# Patient Record
(demographics unavailable — no encounter records)

---

## 2024-10-09 NOTE — REVIEW OF SYSTEMS
[Negative] : Allergic/Immunologic [FreeTextEntry8] : hx of elevated creatinine from lithium [de-identified] : mild hypothyroid

## 2024-10-09 NOTE — PHYSICAL EXAM
[Walk Is Unsteady (Ataxia)] : no ataxia [Wide-Based] : not wide-based [None] : none [Rigidity] : no rigidity [Dystonia] : no dystonia was noted [Labile] : not labile [Normal] : normal [Fair] : fair [FreeTextEntry8] : little down [2 - Borderline mentally ill] : 2 - Borderline mentally ill (subtle or suspected pathology) [5 - Minimally worse] : 5 - Minimally worse  (slightly worse but may not be clinically meaningful; may represent very little change in basic clinical status or functional capacity)

## 2024-10-09 NOTE — PAST MEDICAL HISTORY
[FreeTextEntry1] : 1st psychiatric contact age 28, 1st hospitalization for ayesha  5 total hospitalizations (age 28, 30, 36) and 2 this summer 2021  no SA  Was on lithium for years but developed elevated creatinine and was d/c, also had elevated LFTs on depakote

## 2024-10-09 NOTE — REASON FOR VISIT
[FreeTextEntry2] : 10/24 ISTOP checked [Patient] : Patient [Mother] : mother [FreeTextEntry1] : "I am still a little down, little better from before, not 100%"

## 2024-10-09 NOTE — CURRENT PSYCHIATRIC SYMPTOMS
[Depressed Mood] : no depressed mood [Anhedonia] : anhedonia [Guilt] : not feeling guilty [Decreased Concentration] : no decrease in concentrating ability [Insomnia] : no insomnia disorder [Hypersomnia] : no ~T hypersomnia [Psychomotor Retardation] : no psychomotor retardation [Euphoria] : no euphoria [Highly Irritable] : no high irritability [Increased Activity] : no increased in activity [Distractibility] : not distracted [Talkativeness] : no talkativeness [Grandeur] : no feelings of grandeur [Buying Sprees] : no buying sprees [Hypersexuality] : denied hypersexuality [Dec Need For Sleep] : no decreased need for sleep [Delusions] : no ~T delusions [Hallucination Visual] : no visual hallucinations [Hallucination Auditory] : no auditory hallucinations [Hallucination Tactile] : no tactile hallucinations [Thought Disorder] : ~T a thought disorder was not noted [Excessive Worry] : no excessive worries [Ruminations] : no rumination disorder [Obsessions] : no obsessions [Re-experiencing] : no re-experiencing [Restlessness] : no restlessness [Hypochondriasis] : no hypochondriasis [Panic] : no panic disorder [Recent Onset] : no recent onset of confusion

## 2024-10-09 NOTE — HISTORY OF PRESENT ILLNESS
[FreeTextEntry1] : This is a 40 year old patient, domiciled with Mother, on SSD, with history of Bipolar d/o, anxiety, elevated creatinine, hypothyroid who presents for medication management.  The patient reports mild depressed mood, fair sleep and appetite.  The patient denies any symptoms of ayesha and psychosis at this time.  The patient has less anxiety that impairs their daily functioning. The patient denies any suicidal ideation, homicidal ideation, no auditory or visual hallucinations, or paranoid ideation.   The denies patients denies any drug or alcohol use, and is a non-smoker. I requested the patient's updated physical and labs from their PCP, Dr. Landry.  Coping skills were discussed and a safety plan was provided.  The patient was educated on the risks, benefits, and alternatives of their psychiatric medications.  The patient will not engage in psychotherapy at this time.  The patient consents to ongoing medication management.  Patient seen with Mother, discussed medication options at length.  Safety planning discussed at length, patient has no past or present SI/HI, but is not at baseline.    Patient provided PHQ9 and Clemens rating scale, yet to do.  Labs 9/23 IPP at [facility] reviewed, diet and exercise encouraged.  Per Mother, patient minimally better, risks, benefits dicussed, will lower pm clozapine, discussed augmenting with strattera, lamictal or lithium,m or trileptal again.  They prefer to wean off clozapine, and try trileptal first.  Labs reviewed,  12/23: patient and Mother reports he has some impaired sleep, impaired concentration and daytime sedation.  discussed options at length including resuming clozapine, increasing lorazepam, and increasing risperdal. Risk, benefits discussed, will raise lorazepam 1.5mg-2mg at bedtime and risperdqal to 1mg po qam and 4mg at bedtime 12/27:will give current medications 2 more weeks and then adjust again if needed, consider low dose seroquel as he benefitted from it for depressive symptoms, and has failed monotherapy multiple times 1/11:patient appears close to baseline, Mother and patient agree to continue current medications, with option to lower risperdal in the future if needed 2/7:adjust risperdal 3/6:adjust risperdal 4/3:adjust risperda, conside adding abilify 5/1:lower risperdal and add abilify 2mg po qam, risks, benefits discussed, failed monotherapy, understands risks, MOther agrees as well  5/29:stable, at baseline 6/26:at baseline 7/24:adjust med per plan 9/11:adjust meds per plan    [No] : no [Responsibility to family or others] : responsibility to family or others [Identifies reasons for living] : identifies reasons for living [Future oriented] : future oriented [Supportive social network or family] : supportive social network or family [Fear of death or dying due to pain/suffering] : fear of death or dying due to pain/suffering [Positive therapeutic relationships] : positive therapeutic relationships [Ability to cope with stress] : ability to cope with stress [None Known] : none known [Residential stability] : residential stability [Relationship stability] : relationship stability [Sobriety] : sobriety [Engagement in treatment] : engagement in treatment [Affective stability] : affective stability [Insight into violence risk and need for management/ treatment] : insight into violence risk and need for management/ treatment [Good treatment response/ compliance] : good treatment response/ compliance

## 2024-10-09 NOTE — FAMILY HISTORY
[FreeTextEntry1] : Family history: Maternal grandmother was bipolar (she had a suicide attempt, survived)

## 2024-10-09 NOTE — PLAN
[No] : No [FreeTextEntry4] : Patient mood is improving with good energy  anxiety is less  no psychosis, improved  Overall health is stable in terms of diet and exercise, labs updated

## 2024-10-09 NOTE — DISCUSSION/SUMMARY
[Date of Last Physical Exam: _____] : Date of Last Physical Exam: [unfilled] [Date of Last Annual Labs: _____] : Date of Last Annual Labs: [unfilled] [Annual Review of Systems Completed?] : Annual Review of Systems Completed: Yes [Tobacco Screening Completed?] : Tobacco Screening Completed: Yes [Date of Last AIMS: _____] : Date of Last AIMS: [unfilled] [Date of Last HbgA1c: _____] : Date of Last HbgA1c: [unfilled] [Date of Last Lipid Profile: _____] : Date of Last Lipid Profile: [unfilled] [Potential impact of patient’s physical health conditions on psychiatric care?] : Potential impact of patient's physical health conditions on psychiatric care: No [Does patient require any additional health services or referrals?] : Does patient require any additional health services or referrals: No [FreeTextEntry1] : Medication management  Continue current medications 1. risperdal 2mg at bedtime 3. cogentin 0.5mg po BID 4. lorazepam 1.5mg at bedtime only 5. trileptal 300mg po BID 6. abilify 7mg po qam, raise to 10mg am  Follow up in 4 weeks, will call if needs anything sooner

## 2024-10-09 NOTE — SOCIAL HISTORY
[With Family] : lives with family [Disabled] : disabled [Never ] : never  [College] : College [None] : none [No Known Substance Use] : no known substance use

## 2024-11-06 NOTE — HISTORY OF PRESENT ILLNESS
[No] : no [Responsibility to family or others] : responsibility to family or others [Identifies reasons for living] : identifies reasons for living [Future oriented] : future oriented [Supportive social network or family] : supportive social network or family [Fear of death or dying due to pain/suffering] : fear of death or dying due to pain/suffering [Positive therapeutic relationships] : positive therapeutic relationships [Ability to cope with stress] : ability to cope with stress [None Known] : none known [Residential stability] : residential stability [Relationship stability] : relationship stability [Sobriety] : sobriety [Engagement in treatment] : engagement in treatment [Affective stability] : affective stability [Insight into violence risk and need for management/ treatment] : insight into violence risk and need for management/ treatment [Good treatment response/ compliance] : good treatment response/ compliance [FreeTextEntry1] : This is a 40 year old patient, domiciled with Mother, on SSD, with history of Bipolar d/o, anxiety, elevated creatinine, hypothyroid who presents for medication management.  The patient reports mild depressed mood, fair sleep and appetite.  The patient denies any symptoms of ayesha and psychosis at this time.  The patient has less anxiety that impairs their daily functioning. The patient denies any suicidal ideation, homicidal ideation, no auditory or visual hallucinations, or paranoid ideation.   The denies patients denies any drug or alcohol use, and is a non-smoker. I requested the patient's updated physical and labs from their PCP, Dr. Landry.  Coping skills were discussed and a safety plan was provided.  The patient was educated on the risks, benefits, and alternatives of their psychiatric medications.  The patient will not engage in psychotherapy at this time.  The patient consents to ongoing medication management.  Patient seen with Mother, discussed medication options at length.  Safety planning discussed at length, patient has no past or present SI/HI, but is not at baseline.    Patient provided PHQ9 and Clemens rating scale, yet to do.  Labs 9/23 IPP at [facility] reviewed, diet and exercise encouraged.  Per Mother, patient minimally better, risks, benefits dicussed, will lower pm clozapine, discussed augmenting with strattera, lamictal or lithium,m or trileptal again.  They prefer to wean off clozapine, and try trileptal first.  Labs reviewed,  12/23: patient and Mother reports he has some impaired sleep, impaired concentration and daytime sedation.  discussed options at length including resuming clozapine, increasing lorazepam, and increasing risperdal. Risk, benefits discussed, will raise lorazepam 1.5mg-2mg at bedtime and risperdqal to 1mg po qam and 4mg at bedtime 12/27:will give current medications 2 more weeks and then adjust again if needed, consider low dose seroquel as he benefitted from it for depressive symptoms, and has failed monotherapy multiple times 1/11:patient appears close to baseline, Mother and patient agree to continue current medications, with option to lower risperdal in the future if needed 2/7:adjust risperdal 3/6:adjust risperdal 4/3:adjust risperda, conside adding abilify 5/1:lower risperdal and add abilify 2mg po qam, risks, benefits discussed, failed monotherapy, understands risks, MOther agrees as well  5/29:stable, at baseline 6/26:at baseline 7/24:adjust med per plan 9/11:adjust meds per plan 11/6: adjust meds

## 2024-11-06 NOTE — REASON FOR VISIT
[Patient] : Patient [Mother] : mother [FreeTextEntry2] : 11/24 ISTOP checked [FreeTextEntry1] : "I am still a little down. "

## 2024-11-06 NOTE — CURRENT PSYCHIATRIC SYMPTOMS
[Anhedonia] : anhedonia [Depressed Mood] : no depressed mood [Guilt] : not feeling guilty [Decreased Concentration] : no decrease in concentrating ability [Insomnia] : no insomnia disorder [Hypersomnia] : no ~T hypersomnia [Psychomotor Retardation] : no psychomotor retardation [Euphoria] : no euphoria [Highly Irritable] : no high irritability [Increased Activity] : no increased in activity [Distractibility] : not distracted [Talkativeness] : no talkativeness [Grandeur] : no feelings of grandeur [Buying Sprees] : no buying sprees [Hypersexuality] : denied hypersexuality [Dec Need For Sleep] : no decreased need for sleep [Delusions] : no ~T delusions [Hallucination Visual] : no visual hallucinations [Hallucination Auditory] : no auditory hallucinations [Hallucination Tactile] : no tactile hallucinations [Thought Disorder] : ~T a thought disorder was not noted [Excessive Worry] : no excessive worries [Ruminations] : no rumination disorder [Obsessions] : no obsessions [Re-experiencing] : no re-experiencing [Restlessness] : no restlessness [Hypochondriasis] : no hypochondriasis [Panic] : no panic disorder [Recent Onset] : no recent onset of confusion

## 2024-11-06 NOTE — PHYSICAL EXAM
[None] : none [Normal] : normal [Fair] : fair [2 - Borderline mentally ill] : 2 - Borderline mentally ill (subtle or suspected pathology) [Walk Is Unsteady (Ataxia)] : no ataxia [Wide-Based] : not wide-based [Rigidity] : no rigidity [Dystonia] : no dystonia was noted [Labile] : not labile [FreeTextEntry8] : little down [5 - Minimally worse] : 5 - Minimally worse  (slightly worse but may not be clinically meaningful; may represent very little change in basic clinical status or functional capacity)

## 2024-11-06 NOTE — DISCUSSION/SUMMARY
[Date of Last Physical Exam: _____] : Date of Last Physical Exam: [unfilled] [Date of Last Annual Labs: _____] : Date of Last Annual Labs: [unfilled] [Annual Review of Systems Completed?] : Annual Review of Systems Completed: Yes [Tobacco Screening Completed?] : Tobacco Screening Completed: Yes [Date of Last AIMS: _____] : Date of Last AIMS: [unfilled] [Date of Last HbgA1c: _____] : Date of Last HbgA1c: [unfilled] [Date of Last Lipid Profile: _____] : Date of Last Lipid Profile: [unfilled] [Potential impact of patientâ??s physical health conditions on psychiatric care?] : Potential impact of patient's physical health conditions on psychiatric care: No [Does patient require any additional health services or referrals?] : Does patient require any additional health services or referrals: No [FreeTextEntry1] : Medication management  Continue current medications 1. risperdal 2mg at bedtime 3. cogentin 0.5mg po BID 4. lorazepam 1.5mg at bedtime only 5. trileptal 300mg po BID 6. abilify 10mg po qam, raise to 12mg am  Follow up in 4 weeks, will call if needs anything sooner

## 2024-11-06 NOTE — REVIEW OF SYSTEMS
[Negative] : Allergic/Immunologic [FreeTextEntry8] : hx of elevated creatinine from lithium [de-identified] : mild hypothyroid

## 2024-12-04 NOTE — REVIEW OF SYSTEMS
[Negative] : Allergic/Immunologic [FreeTextEntry8] : hx of elevated creatinine from lithium [de-identified] : mild hypothyroid

## 2024-12-04 NOTE — DISCUSSION/SUMMARY
[Date of Last Physical Exam: _____] : Date of Last Physical Exam: [unfilled] [Date of Last Annual Labs: _____] : Date of Last Annual Labs: [unfilled] [Annual Review of Systems Completed?] : Annual Review of Systems Completed: Yes [Tobacco Screening Completed?] : Tobacco Screening Completed: Yes [Date of Last AIMS: _____] : Date of Last AIMS: [unfilled] [Date of Last HbgA1c: _____] : Date of Last HbgA1c: [unfilled] [Date of Last Lipid Profile: _____] : Date of Last Lipid Profile: [unfilled] [Potential impact of patientâ??s physical health conditions on psychiatric care?] : Potential impact of patient's physical health conditions on psychiatric care: No [Does patient require any additional health services or referrals?] : Does patient require any additional health services or referrals: No [FreeTextEntry1] : Medication management  Continue current medications 1. risperdal 2mg at bedtime 3. cogentin 0.5mg po BID 4. lorazepam 1.5mg at bedtime only 5. trileptal 300mg po BID 6. abilify 12mg po qam, raise to 15mg am  Follow up in 4 weeks, will call if needs anything sooner

## 2024-12-04 NOTE — PHYSICAL EXAM
[None] : none [Normal] : normal [Fair] : fair [2 - Borderline mentally ill] : 2 - Borderline mentally ill (subtle or suspected pathology) [5 - Minimally worse] : 5 - Minimally worse  (slightly worse but may not be clinically meaningful; may represent very little change in basic clinical status or functional capacity) [Walk Is Unsteady (Ataxia)] : no ataxia [Wide-Based] : not wide-based [Rigidity] : no rigidity [Dystonia] : no dystonia was noted [Labile] : not labile [FreeTextEntry8] : little down

## 2024-12-04 NOTE — REASON FOR VISIT
[Patient] : Patient [Mother] : mother [FreeTextEntry2] : 12/24 ISTOP checked [FreeTextEntry1] : "I am still a little down. "

## 2024-12-04 NOTE — HISTORY OF PRESENT ILLNESS
[No] : no [Responsibility to family or others] : responsibility to family or others [Identifies reasons for living] : identifies reasons for living [Future oriented] : future oriented [Supportive social network or family] : supportive social network or family [Fear of death or dying due to pain/suffering] : fear of death or dying due to pain/suffering [Positive therapeutic relationships] : positive therapeutic relationships [Ability to cope with stress] : ability to cope with stress [None Known] : none known [Residential stability] : residential stability [Relationship stability] : relationship stability [Sobriety] : sobriety [Engagement in treatment] : engagement in treatment [Affective stability] : affective stability [Insight into violence risk and need for management/ treatment] : insight into violence risk and need for management/ treatment [Good treatment response/ compliance] : good treatment response/ compliance [FreeTextEntry1] : This is a 40 year old patient, domiciled with Mother, on SSD, with history of Bipolar d/o, anxiety, elevated creatinine, hypothyroid who presents for medication management.  The patient reports mild depressed mood, fair sleep and appetite.  The patient denies any symptoms of ayesha and psychosis at this time.  The patient has less anxiety that impairs their daily functioning. The patient denies any suicidal ideation, homicidal ideation, no auditory or visual hallucinations, or paranoid ideation.   The denies patients denies any drug or alcohol use, and is a non-smoker. I requested the patient's updated physical and labs from their PCP, Dr. Landry.  Coping skills were discussed and a safety plan was provided.  The patient was educated on the risks, benefits, and alternatives of their psychiatric medications.  The patient will not engage in psychotherapy at this time.  The patient consents to ongoing medication management.  Patient seen with Mother, discussed medication options at length.  Safety planning discussed at length, patient has no past or present SI/HI, but is not at baseline.    Patient provided PHQ9 and Clemens rating scale, yet to do.  Labs 9/23 IPP at [facility] reviewed, diet and exercise encouraged.  Per Mother, patient minimally better, risks, benefits dicussed, will lower pm clozapine, discussed augmenting with strattera, lamictal or lithium,m or trileptal again.  They prefer to wean off clozapine, and try trileptal first.  Labs reviewed,  12/23: patient and Mother reports he has some impaired sleep, impaired concentration and daytime sedation.  discussed options at length including resuming clozapine, increasing lorazepam, and increasing risperdal. Risk, benefits discussed, will raise lorazepam 1.5mg-2mg at bedtime and risperdqal to 1mg po qam and 4mg at bedtime 12/27:will give current medications 2 more weeks and then adjust again if needed, consider low dose seroquel as he benefitted from it for depressive symptoms, and has failed monotherapy multiple times 1/11:patient appears close to baseline, Mother and patient agree to continue current medications, with option to lower risperdal in the future if needed 2/7:adjust risperdal 3/6:adjust risperdal 4/3:adjust risperda, conside adding abilify 5/1:lower risperdal and add abilify 2mg po qam, risks, benefits discussed, failed monotherapy, understands risks, MOther agrees as well  5/29:stable, at baseline 6/26:at baseline 7/24:adjust med per plan 9/11:adjust meds per plan 11/6: adjust meds 12/4: adjust meds

## 2024-12-31 NOTE — DISCUSSION/SUMMARY
[Date of Last Physical Exam: _____] : Date of Last Physical Exam: [unfilled] [Date of Last Annual Labs: _____] : Date of Last Annual Labs: [unfilled] [Annual Review of Systems Completed?] : Annual Review of Systems Completed: Yes [Tobacco Screening Completed?] : Tobacco Screening Completed: Yes [Date of Last AIMS: _____] : Date of Last AIMS: [unfilled] [Date of Last HbgA1c: _____] : Date of Last HbgA1c: [unfilled] [Date of Last Lipid Profile: _____] : Date of Last Lipid Profile: [unfilled] [Potential impact of patientâ??s physical health conditions on psychiatric care?] : Potential impact of patient's physical health conditions on psychiatric care: No [Does patient require any additional health services or referrals?] : Does patient require any additional health services or referrals: No [FreeTextEntry1] : Medication management  Continue current medications 1. risperdal 2mg at bedtime 3. cogentin 0.5mg po BID 4. lorazepam 1.5mg at bedtime only 5. trileptal 300mg po BID 6. abilify 15mg am  Follow up in 4 weeks, will call if needs anything sooner

## 2024-12-31 NOTE — REASON FOR VISIT
[Patient] : Patient [Mother] : mother [Telehealth (audio & video) - Individual/Group] : This visit was provided via telehealth using real-time 2-way audio visual technology. [FreeTextEntry4] : 1016am [FreeTextEntry5] : [id number] [FreeTextEntry2] : 12/24 ISTOP checked [FreeTextEntry1] : "I am better, let's keep the meds the same."

## 2024-12-31 NOTE — HISTORY OF PRESENT ILLNESS
[No] : no [Responsibility to family or others] : responsibility to family or others [Identifies reasons for living] : identifies reasons for living [Future oriented] : future oriented [Supportive social network or family] : supportive social network or family [Fear of death or dying due to pain/suffering] : fear of death or dying due to pain/suffering [Positive therapeutic relationships] : positive therapeutic relationships [Ability to cope with stress] : ability to cope with stress [None Known] : none known [Residential stability] : residential stability [Relationship stability] : relationship stability [Sobriety] : sobriety [Engagement in treatment] : engagement in treatment [Affective stability] : affective stability [Insight into violence risk and need for management/ treatment] : insight into violence risk and need for management/ treatment [Good treatment response/ compliance] : good treatment response/ compliance [FreeTextEntry1] : This is a 40 year old patient, domiciled with Mother, on SSD, with history of Bipolar d/o, anxiety, elevated creatinine, hypothyroid who presents for medication management.  The patient reports less depressed mood, fair sleep and appetite.  The patient denies any symptoms of ayesha and psychosis at this time.  The patient has less anxiety that impairs their daily functioning. The patient denies any suicidal ideation, homicidal ideation, no auditory or visual hallucinations, or paranoid ideation.   The denies patients denies any drug or alcohol use, and is a non-smoker. I requested the patient's updated physical and labs from their PCP, Dr. Landry.  Coping skills were discussed and a safety plan was provided.  The patient was educated on the risks, benefits, and alternatives of their psychiatric medications.  The patient will not engage in psychotherapy at this time.  The patient consents to ongoing medication management.  Patient seen with Mother, discussed medication options at length.  Safety planning discussed at length, patient has no past or present SI/HI, but is not at baseline.    Patient provided PHQ9 and Clemens rating scale, yet to do.  [facility], diet and exercise encouraged.  Per Mother, patient minimally better, risks, benefits dicussed, will lower pm clozapine, discussed augmenting with strattera, lamictal or lithium,m or trileptal again.  They prefer to wean off clozapine, and try trileptal first.  Labs reviewed,  12/23: patient and Mother reports he has some impaired sleep, impaired concentration and daytime sedation.  discussed options at length including resuming clozapine, increasing lorazepam, and increasing risperdal. Risk, benefits discussed, will raise lorazepam 1.5mg-2mg at bedtime and risperdqal to 1mg po qam and 4mg at bedtime 12/27:will give current medications 2 more weeks and then adjust again if needed, consider low dose seroquel as he benefitted from it for depressive symptoms, and has failed monotherapy multiple times 1/11:patient appears close to baseline, Mother and patient agree to continue current medications, with option to lower risperdal in the future if needed 2/7:adjust risperdal 3/6:adjust risperdal 4/3:adjust risperda, conside adding abilify 5/1:lower risperdal and add abilify 2mg po qam, risks, benefits discussed, failed monotherapy, understands risks, MOther agrees as well  5/29:stable, at baseline 6/26:at baseline 7/24:adjust med per plan 9/11:adjust meds per plan 11/6: adjust meds 12/4: adjust meds 12/31:continue meds

## 2024-12-31 NOTE — REVIEW OF SYSTEMS
[Negative] : Allergic/Immunologic [FreeTextEntry8] : hx of elevated creatinine from lithium [de-identified] : mild hypothyroid

## 2024-12-31 NOTE — PHYSICAL EXAM
[None] : none [Normal] : normal [Fair] : fair [2 - Borderline mentally ill] : 2 - Borderline mentally ill (subtle or suspected pathology) [Walk Is Unsteady (Ataxia)] : no ataxia [Wide-Based] : not wide-based [Rigidity] : no rigidity [Dystonia] : no dystonia was noted [Labile] : not labile [2 - Much improved] : 2 - Much improved  (notably better with significant reduction of symptoms; increase in the level of functioning but some symptoms remain)

## 2025-01-29 NOTE — FAMILY HISTORY
[FreeTextEntry1] : Family history: Maternal grandmother was bipolar (she had a suicide attempt, survived)
No adenopathy or splenomegaly. No cervical or inguinal lymphadenopathy.

## 2025-01-29 NOTE — PHYSICAL EXAM
[None] : none [Normal] : normal [Fair] : fair [Walk Is Unsteady (Ataxia)] : no ataxia [Wide-Based] : not wide-based [Rigidity] : no rigidity [Dystonia] : no dystonia was noted [Constricted] : constricted [FreeTextEntry8] : depressed [3 - Mildly ill] : 3 - Mildly ill  (clearly established symptoms with minimal, if any, distress or difficulty in social and occupational function) [5 - Minimally worse] : 5 - Minimally worse  (slightly worse but may not be clinically meaningful; may represent very little change in basic clinical status or functional capacity)

## 2025-01-29 NOTE — REVIEW OF SYSTEMS
[Negative] : Allergic/Immunologic [FreeTextEntry8] : hx of elevated creatinine from lithium [de-identified] : mild hypothyroid

## 2025-01-29 NOTE — REASON FOR VISIT
[Patient] : Patient [Mother] : mother [FreeTextEntry2] : 1/25 ISTOP checked [FreeTextEntry1] : "I am more depressed, I think the higher dose of abilify makes my anxious."

## 2025-01-29 NOTE — HISTORY OF PRESENT ILLNESS
[No] : no [Responsibility to family or others] : responsibility to family or others [Identifies reasons for living] : identifies reasons for living [Future oriented] : future oriented [Supportive social network or family] : supportive social network or family [Fear of death or dying due to pain/suffering] : fear of death or dying due to pain/suffering [Positive therapeutic relationships] : positive therapeutic relationships [Ability to cope with stress] : ability to cope with stress [None Known] : none known [Residential stability] : residential stability [Relationship stability] : relationship stability [Sobriety] : sobriety [Engagement in treatment] : engagement in treatment [Affective stability] : affective stability [Insight into violence risk and need for management/ treatment] : insight into violence risk and need for management/ treatment [Good treatment response/ compliance] : good treatment response/ compliance [FreeTextEntry1] : This is a 40 year old patient, domiciled with Mother, on SSD, with history of Bipolar d/o, anxiety, elevated creatinine, hypothyroid who presents for medication management.  The patient reports more depressed mood, fair sleep and appetite.  The patient denies any symptoms of ayesha and psychosis at this time.  The patient has more anxiety that impairs their daily functioning. The patient denies any suicidal ideation, homicidal ideation, no auditory or visual hallucinations, or paranoid ideation.   The denies patients denies any drug or alcohol use, and is a non-smoker. I requested the patient's updated physical and labs from their PCP, Dr. Landry.  Coping skills were discussed and a safety plan was provided.  The patient was educated on the risks, benefits, and alternatives of their psychiatric medications.  The patient will not engage in psychotherapy at this time.  The patient consents to ongoing medication management.  Patient seen with Mother, discussed medication options at length  12/23: patient and Mother reports he has some impaired sleep, impaired concentration and daytime sedation.  discussed options at length including resuming clozapine, increasing lorazepam, and increasing risperdal. Risk, benefits discussed, will raise lorazepam 1.5mg-2mg at bedtime and risperdqal to 1mg po qam and 4mg at bedtime 12/27:will give current medications 2 more weeks and then adjust again if needed, consider low dose seroquel as he benefitted from it for depressive symptoms, and has failed monotherapy multiple times 1/11:patient appears close to baseline, Mother and patient agree to continue current medications, with option to lower risperdal in the future if needed 2/7:adjust risperdal 3/6:adjust risperdal 4/3:adjust risperda, conside adding abilify 5/1:lower risperdal and add abilify 2mg po qam, risks, benefits discussed, failed monotherapy, understands risks, MOther agrees as well  5/29:stable, at baseline 6/26:at baseline 7/24:adjust med per plan 9/11:adjust meds per plan 11/6: adjust meds 12/4: adjust meds 12/31:continue meds 1/18: resume risperdal 1mg po qam and 2mg at bedtime for "anxiety and intrusive thoughts" call in 1 week if needed or follow up in 2 weeks 1/27: patient is restless, still feeling lack of motivation, agree to lower abilify to 10mg and lower lorazepam to 1mg at bedtime, f/u in 2 days 1/29:lower abilify to 5mg daily in 3-4 days and start lamictal 25mg daily for 2 weeks then BID

## 2025-01-29 NOTE — PLAN
[No] : No [FreeTextEntry4] : Patient mood is now more depressed  anxiety is more, likely akathisia  no psychosis, improved  Overall health is stable in terms of diet and exercise, labs updated

## 2025-01-29 NOTE — DISCUSSION/SUMMARY
[Date of Last Physical Exam: _____] : Date of Last Physical Exam: [unfilled] [Date of Last Annual Labs: _____] : Date of Last Annual Labs: [unfilled] [Annual Review of Systems Completed?] : Annual Review of Systems Completed: Yes [Tobacco Screening Completed?] : Tobacco Screening Completed: Yes [Date of Last AIMS: _____] : Date of Last AIMS: [unfilled] [Date of Last HbgA1c: _____] : Date of Last HbgA1c: [unfilled] [Date of Last Lipid Profile: _____] : Date of Last Lipid Profile: [unfilled] [Potential impact of patientâ??s physical health conditions on psychiatric care?] : Potential impact of patient's physical health conditions on psychiatric care: No [Does patient require any additional health services or referrals?] : Does patient require any additional health services or referrals: No [FreeTextEntry1] : Medication management  Continue current medications 1. risperdal 2mg at bedtime 3. cogentin 0.5mg po BID 4. lorazepam 1.5mg at bedtime only, lowered to 1mg at bedtime 5. trileptal 300mg po BID 6. abilify 15mg am, lowered to 10mg then 5mg daily 7. start lamictal 25mg po daily for 2 weeks then BID  Follow up in 4 weeks, will call if needs anything sooner

## 2025-02-26 NOTE — REVIEW OF SYSTEMS
[Negative] : Allergic/Immunologic [FreeTextEntry8] : hx of elevated creatinine from lithium [de-identified] : mild hypothyroid

## 2025-02-26 NOTE — DISCUSSION/SUMMARY
[Date of Last Physical Exam: _____] : Date of Last Physical Exam: [unfilled] [Date of Last Annual Labs: _____] : Date of Last Annual Labs: [unfilled] [Annual Review of Systems Completed?] : Annual Review of Systems Completed: Yes [Tobacco Screening Completed?] : Tobacco Screening Completed: Yes [Date of Last AIMS: _____] : Date of Last AIMS: [unfilled] [Date of Last HbgA1c: _____] : Date of Last HbgA1c: [unfilled] [Date of Last Lipid Profile: _____] : Date of Last Lipid Profile: [unfilled] [Potential impact of patientÃ¢Â?Â?s physical health conditions on psychiatric care?] : Potential impact of patient's physical health conditions on psychiatric care: No [Does patient require any additional health services or referrals?] : Does patient require any additional health services or referrals: No [Plan Review] : Plan Review [Able to manage surrounding demands and opportunities] : able to manage surrounding demands and opportunities [Able to exercise self-direction] : able to exercise self-direction [Able to set and pursue goals] : able to set and pursue goals [Adherent to treatment recommendations] : adherent to treatment recommendations [Articulate] : articulate [Attempting to realize their potential] : Attempting to realize their potential [Awareness of substance use issues] : awareness of substance use issues [Cognitively intact] : cognitively intact [Good impulse control] : good impulse control [Insightful] : insightful [Creative] : creative [Intelligent] : intelligent [Motivated to participate in treatment] : motivated to participate in treatment [Motivated and ready for change] : motivated and ready for change [Motivated to maintain or improve physical health] : motivated to maintain or improve physical health [In good health] : in good health [Has vocational interests or hobbies] : has vocational interests or hobbies [Part of a supportive family] : part of a supportive family [Housing stability] : housing stability [High level of education] : high level of education [English fluency] : English fluency [Connected to healthcare] : connected to healthcare [Access to safe outdoor spaces] : access to safe outdoor spaces [Social supports] : social supports [FreeTextEntry2] : 1/2/26 [FreeTextEntry3] : see chart [FreeTextEntry5] : stable, see progress notes plan for updates each visit [Mental Health] : Mental Health [Physical Health] : Physical Health [Other rationale for transition/discharge:] : Other rationale for transition/discharge: [None - Reason others did not participate:] : None - Reason others did not participate:  [Psychiatric Provider/Prescriber] : Psychiatric Provider/Prescriber [FreeTextEntry1] : see above [de-identified] : chronic condition [Denied] : Denied [No] : No [Completed, copy requested] : Completed, copy requested [Advised to schedule] : Advised to schedule [Ordered] : Ordered [Yes] : Yes [From last 12 months, Score: ___] : AIMS results from last 12 months, Score: [unfilled] [Does patient use tobacco products?] : Patient does not use tobacco products [Does patient use medical marijuana?] : Patient does not use medical marijuana. [Patient has been tested for HIV?] : Patient has not been tested for HIV [Patient has been tested for Hepatitis?] : Patient has not been tested for hepatitis [Patient would like to be tested/re-tested?] : patient would not like to be tested/re-tested [Current or past COVID-19 diagnosis?] : Patient had a present or past COVID-19 diagnosis [Vaccinated?] : is vaccinated [Education provided about COVID-19?] : Education provided about COVID-19

## 2025-02-26 NOTE — PHYSICAL EXAM
[None] : none [Constricted] : constricted [Fair] : fair [3 - Mildly ill] : 3 - Mildly ill  (clearly established symptoms with minimal, if any, distress or difficulty in social and occupational function) [Normal] : full range, appropriate to content [3 - Minimally improved] : 3 - Minimally improved  (slightly better with little or no clinically meaningful reduction of symptoms. Represents very little change in basic clinical status, level of care, or functional capacity) [Walk Is Unsteady (Ataxia)] : no ataxia [Wide-Based] : not wide-based [Rigidity] : no rigidity [Dystonia] : no dystonia was noted [FreeTextEntry8] : better

## 2025-02-26 NOTE — REVIEW OF SYSTEMS
[Negative] : Allergic/Immunologic [FreeTextEntry8] : hx of elevated creatinine from lithium [de-identified] : mild hypothyroid

## 2025-02-26 NOTE — HISTORY OF PRESENT ILLNESS
[No] : no [Responsibility to family or others] : responsibility to family or others [Identifies reasons for living] : identifies reasons for living [Future oriented] : future oriented [Supportive social network or family] : supportive social network or family [Fear of death or dying due to pain/suffering] : fear of death or dying due to pain/suffering [Positive therapeutic relationships] : positive therapeutic relationships [Ability to cope with stress] : ability to cope with stress [None Known] : none known [Residential stability] : residential stability [Relationship stability] : relationship stability [Sobriety] : sobriety [Engagement in treatment] : engagement in treatment [Affective stability] : affective stability [Insight into violence risk and need for management/ treatment] : insight into violence risk and need for management/ treatment [Good treatment response/ compliance] : good treatment response/ compliance [FreeTextEntry1] : This is a 40 year old patient, domiciled with Mother, on SSD, with history of Bipolar d/o, anxiety, elevated creatinine, hypothyroid who presents for medication management.  The patient reports more depressed mood, fair sleep and appetite.  The patient denies any symptoms of ayehsa and psychosis at this time.  The patient has more anxiety that impairs their daily functioning. The patient denies any suicidal ideation, homicidal ideation, no auditory or visual hallucinations, or paranoid ideation.   The denies patients denies any drug or alcohol use, and is a non-smoker. I requested the patient's updated physical and labs from their PCP, Dr. Landry.  Coping skills were discussed and a safety plan was provided.  The patient was educated on the risks, benefits, and alternatives of their psychiatric medications.  The patient will not engage in psychotherapy at this time.  The patient consents to ongoing medication management.  Patient seen with Mother, discussed medication options at length  12/23: patient and Mother reports he has some impaired sleep, impaired concentration and daytime sedation.  discussed options at length including resuming clozapine, increasing lorazepam, and increasing risperdal. Risk, benefits discussed, will raise lorazepam 1.5mg-2mg at bedtime and risperdqal to 1mg po qam and 4mg at bedtime 12/27:will give current medications 2 more weeks and then adjust again if needed, consider low dose seroquel as he benefitted from it for depressive symptoms, and has failed monotherapy multiple times 1/11:patient appears close to baseline, Mother and patient agree to continue current medications, with option to lower risperdal in the future if needed 2/7:adjust risperdal 3/6:adjust risperdal 4/3:adjust risperda, conside adding abilify 5/1:lower risperdal and add abilify 2mg po qam, risks, benefits discussed, failed monotherapy, understands risks, MOther agrees as well  5/29:stable, at baseline 6/26:at baseline 7/24:adjust med per plan 9/11:adjust meds per plan 11/6: adjust meds 12/4: adjust meds 12/31:continue meds 1/18: resume risperdal 1mg po qam and 2mg at bedtime for "anxiety and intrusive thoughts" call in 1 week if needed or follow up in 2 weeks 1/27: patient is restless, still feeling lack of motivation, agree to lower abilify to 10mg and lower lorazepam to 1mg at bedtime, f/u in 2 days 1/29:lower abilify to 5mg daily in 3-4 days and start lamictal 25mg daily for 2 weeks then BID 2/26:change risperdal to 3mg at bedtime, and titrate lamictal

## 2025-02-26 NOTE — HISTORY OF PRESENT ILLNESS
[No] : no [Responsibility to family or others] : responsibility to family or others [Identifies reasons for living] : identifies reasons for living [Future oriented] : future oriented [Supportive social network or family] : supportive social network or family [Fear of death or dying due to pain/suffering] : fear of death or dying due to pain/suffering [Positive therapeutic relationships] : positive therapeutic relationships [Ability to cope with stress] : ability to cope with stress [None Known] : none known [Residential stability] : residential stability [Relationship stability] : relationship stability [Sobriety] : sobriety [Engagement in treatment] : engagement in treatment [Affective stability] : affective stability [Insight into violence risk and need for management/ treatment] : insight into violence risk and need for management/ treatment [Good treatment response/ compliance] : good treatment response/ compliance [FreeTextEntry1] : This is a 40 year old patient, domiciled with Mother, on SSD, with history of Bipolar d/o, anxiety, elevated creatinine, hypothyroid who presents for medication management.  The patient reports more depressed mood, fair sleep and appetite.  The patient denies any symptoms of ayesha and psychosis at this time.  The patient has more anxiety that impairs their daily functioning. The patient denies any suicidal ideation, homicidal ideation, no auditory or visual hallucinations, or paranoid ideation.   The denies patients denies any drug or alcohol use, and is a non-smoker. I requested the patient's updated physical and labs from their PCP, Dr. Landry.  Coping skills were discussed and a safety plan was provided.  The patient was educated on the risks, benefits, and alternatives of their psychiatric medications.  The patient will not engage in psychotherapy at this time.  The patient consents to ongoing medication management.  Patient seen with Mother, discussed medication options at length  12/23: patient and Mother reports he has some impaired sleep, impaired concentration and daytime sedation.  discussed options at length including resuming clozapine, increasing lorazepam, and increasing risperdal. Risk, benefits discussed, will raise lorazepam 1.5mg-2mg at bedtime and risperdqal to 1mg po qam and 4mg at bedtime 12/27:will give current medications 2 more weeks and then adjust again if needed, consider low dose seroquel as he benefitted from it for depressive symptoms, and has failed monotherapy multiple times 1/11:patient appears close to baseline, Mother and patient agree to continue current medications, with option to lower risperdal in the future if needed 2/7:adjust risperdal 3/6:adjust risperdal 4/3:adjust risperda, conside adding abilify 5/1:lower risperdal and add abilify 2mg po qam, risks, benefits discussed, failed monotherapy, understands risks, MOther agrees as well  5/29:stable, at baseline 6/26:at baseline 7/24:adjust med per plan 9/11:adjust meds per plan 11/6: adjust meds 12/4: adjust meds 12/31:continue meds 1/18: resume risperdal 1mg po qam and 2mg at bedtime for "anxiety and intrusive thoughts" call in 1 week if needed or follow up in 2 weeks 1/27: patient is restless, still feeling lack of motivation, agree to lower abilify to 10mg and lower lorazepam to 1mg at bedtime, f/u in 2 days 1/29:lower abilify to 5mg daily in 3-4 days and start lamictal 25mg daily for 2 weeks then BID 2/26:change risperdal to 3mg at bedtime, and titrate lamictal

## 2025-02-26 NOTE — REASON FOR VISIT
[Patient] : Patient [Mother] : mother [FreeTextEntry2] : 2/25 ISTOP checked [FreeTextEntry1] : "I am better."

## 2025-02-26 NOTE — REVIEW OF SYSTEMS
[Negative] : Allergic/Immunologic [FreeTextEntry8] : hx of elevated creatinine from lithium [de-identified] : mild hypothyroid

## 2025-02-26 NOTE — DISCUSSION/SUMMARY
[Date of Last Physical Exam: _____] : Date of Last Physical Exam: [unfilled] [Date of Last Annual Labs: _____] : Date of Last Annual Labs: [unfilled] [Annual Review of Systems Completed?] : Annual Review of Systems Completed: Yes [Tobacco Screening Completed?] : Tobacco Screening Completed: Yes [Date of Last AIMS: _____] : Date of Last AIMS: [unfilled] [Date of Last HbgA1c: _____] : Date of Last HbgA1c: [unfilled] [Date of Last Lipid Profile: _____] : Date of Last Lipid Profile: [unfilled] [Potential impact of patientÃ¢Â?Â?s physical health conditions on psychiatric care?] : Potential impact of patient's physical health conditions on psychiatric care: No [Does patient require any additional health services or referrals?] : Does patient require any additional health services or referrals: No [Plan Review] : Plan Review [Able to manage surrounding demands and opportunities] : able to manage surrounding demands and opportunities [Able to exercise self-direction] : able to exercise self-direction [Able to set and pursue goals] : able to set and pursue goals [Adherent to treatment recommendations] : adherent to treatment recommendations [Articulate] : articulate [Attempting to realize their potential] : Attempting to realize their potential [Awareness of substance use issues] : awareness of substance use issues [Cognitively intact] : cognitively intact [Good impulse control] : good impulse control [Insightful] : insightful [Creative] : creative [Intelligent] : intelligent [Motivated to participate in treatment] : motivated to participate in treatment [Motivated and ready for change] : motivated and ready for change [Motivated to maintain or improve physical health] : motivated to maintain or improve physical health [In good health] : in good health [Has vocational interests or hobbies] : has vocational interests or hobbies [Part of a supportive family] : part of a supportive family [Housing stability] : housing stability [High level of education] : high level of education [English fluency] : English fluency [Connected to healthcare] : connected to healthcare [Access to safe outdoor spaces] : access to safe outdoor spaces [Social supports] : social supports [FreeTextEntry2] : 1/2/26 [FreeTextEntry3] : see chart [FreeTextEntry5] : stable, see progress notes plan for updates each visit [Mental Health] : Mental Health [Physical Health] : Physical Health [Other rationale for transition/discharge:] : Other rationale for transition/discharge: [None - Reason others did not participate:] : None - Reason others did not participate:  [Psychiatric Provider/Prescriber] : Psychiatric Provider/Prescriber [FreeTextEntry1] : see above [de-identified] : chronic condition [Denied] : Denied [No] : No [Completed, copy requested] : Completed, copy requested [Advised to schedule] : Advised to schedule [Ordered] : Ordered [Yes] : Yes [From last 12 months, Score: ___] : AIMS results from last 12 months, Score: [unfilled] [Does patient use tobacco products?] : Patient does not use tobacco products [Does patient use medical marijuana?] : Patient does not use medical marijuana. [Patient has been tested for HIV?] : Patient has not been tested for HIV [Patient has been tested for Hepatitis?] : Patient has not been tested for hepatitis [Patient would like to be tested/re-tested?] : patient would not like to be tested/re-tested [Current or past COVID-19 diagnosis?] : Patient had a present or past COVID-19 diagnosis [Vaccinated?] : is vaccinated [Education provided about COVID-19?] : Education provided about COVID-19

## 2025-02-26 NOTE — DISCUSSION/SUMMARY
[Date of Last Physical Exam: _____] : Date of Last Physical Exam: [unfilled] [Date of Last Annual Labs: _____] : Date of Last Annual Labs: [unfilled] [Annual Review of Systems Completed?] : Annual Review of Systems Completed: Yes [Tobacco Screening Completed?] : Tobacco Screening Completed: Yes [Date of Last AIMS: _____] : Date of Last AIMS: [unfilled] [Date of Last HbgA1c: _____] : Date of Last HbgA1c: [unfilled] [Date of Last Lipid Profile: _____] : Date of Last Lipid Profile: [unfilled] [Potential impact of patientÃ¢Â?Â?s physical health conditions on psychiatric care?] : Potential impact of patient's physical health conditions on psychiatric care: No [Does patient require any additional health services or referrals?] : Does patient require any additional health services or referrals: No [Plan Review] : Plan Review [Able to manage surrounding demands and opportunities] : able to manage surrounding demands and opportunities [Able to exercise self-direction] : able to exercise self-direction [Able to set and pursue goals] : able to set and pursue goals [Adherent to treatment recommendations] : adherent to treatment recommendations [Articulate] : articulate [Attempting to realize their potential] : Attempting to realize their potential [Awareness of substance use issues] : awareness of substance use issues [Cognitively intact] : cognitively intact [Good impulse control] : good impulse control [Insightful] : insightful [Creative] : creative [Intelligent] : intelligent [Motivated to participate in treatment] : motivated to participate in treatment [Motivated and ready for change] : motivated and ready for change [Motivated to maintain or improve physical health] : motivated to maintain or improve physical health [In good health] : in good health [Has vocational interests or hobbies] : has vocational interests or hobbies [Part of a supportive family] : part of a supportive family [Housing stability] : housing stability [High level of education] : high level of education [English fluency] : English fluency [Connected to healthcare] : connected to healthcare [Access to safe outdoor spaces] : access to safe outdoor spaces [Social supports] : social supports [FreeTextEntry2] : 1/2/26 [FreeTextEntry3] : see chart [FreeTextEntry5] : stable, see progress notes plan for updates each visit [Mental Health] : Mental Health [Physical Health] : Physical Health [Other rationale for transition/discharge:] : Other rationale for transition/discharge: [None - Reason others did not participate:] : None - Reason others did not participate:  [Psychiatric Provider/Prescriber] : Psychiatric Provider/Prescriber [FreeTextEntry1] : see above [de-identified] : chronic condition [Denied] : Denied [No] : No [Completed, copy requested] : Completed, copy requested [Advised to schedule] : Advised to schedule [Ordered] : Ordered [Yes] : Yes [From last 12 months, Score: ___] : AIMS results from last 12 months, Score: [unfilled] [Does patient use tobacco products?] : Patient does not use tobacco products [Does patient use medical marijuana?] : Patient does not use medical marijuana. [Patient has been tested for HIV?] : Patient has not been tested for HIV [Patient has been tested for Hepatitis?] : Patient has not been tested for hepatitis [Patient would like to be tested/re-tested?] : patient would not like to be tested/re-tested [Current or past COVID-19 diagnosis?] : Patient had a present or past COVID-19 diagnosis [Vaccinated?] : is vaccinated [Education provided about COVID-19?] : Education provided about COVID-19

## 2025-03-26 NOTE — REVIEW OF SYSTEMS
[Negative] : Allergic/Immunologic [FreeTextEntry8] : hx of elevated creatinine from lithium [de-identified] : mild hypothyroid

## 2025-03-26 NOTE — REASON FOR VISIT
[Patient] : Patient [Mother] : mother [FreeTextEntry2] : 3/25 ISTOP checked [FreeTextEntry1] : "I am fine."

## 2025-03-26 NOTE — HISTORY OF PRESENT ILLNESS
[No] : no [Responsibility to family or others] : responsibility to family or others [Identifies reasons for living] : identifies reasons for living [Future oriented] : future oriented [Supportive social network or family] : supportive social network or family [Fear of death or dying due to pain/suffering] : fear of death or dying due to pain/suffering [Positive therapeutic relationships] : positive therapeutic relationships [Ability to cope with stress] : ability to cope with stress [None Known] : none known [Residential stability] : residential stability [Relationship stability] : relationship stability [Sobriety] : sobriety [Engagement in treatment] : engagement in treatment [Affective stability] : affective stability [Insight into violence risk and need for management/ treatment] : insight into violence risk and need for management/ treatment [Good treatment response/ compliance] : good treatment response/ compliance [FreeTextEntry1] : This is a 40 year old patient, domiciled with Mother, on SSD, with history of Bipolar d/o, anxiety, elevated creatinine, hypothyroid who presents for medication management.  The patient reports more depressed mood, fair sleep and appetite.  The patient denies any symptoms of ayesha and psychosis at this time.  The patient has more anxiety that impairs their daily functioning. The patient denies any suicidal ideation, homicidal ideation, no auditory or visual hallucinations, or paranoid ideation.   The denies patients denies any drug or alcohol use, and is a non-smoker. I requested the patient's updated physical and labs from their PCP, Dr. Landry.  Coping skills were discussed and a safety plan was provided.  The patient was educated on the risks, benefits, and alternatives of their psychiatric medications.  The patient will not engage in psychotherapy at this time.  The patient consents to ongoing medication management.  Patient seen with Mother, discussed medication options at length  12/23: patient and Mother reports he has some impaired sleep, impaired concentration and daytime sedation.  discussed options at length including resuming clozapine, increasing lorazepam, and increasing risperdal. Risk, benefits discussed, will raise lorazepam 1.5mg-2mg at bedtime and risperdqal to 1mg po qam and 4mg at bedtime 12/27:will give current medications 2 more weeks and then adjust again if needed, consider low dose seroquel as he benefitted from it for depressive symptoms, and has failed monotherapy multiple times 1/11:patient appears close to baseline, Mother and patient agree to continue current medications, with option to lower risperdal in the future if needed 2/7:adjust risperdal 3/6:adjust risperdal 4/3:adjust risperda, conside adding abilify 5/1:lower risperdal and add abilify 2mg po qam, risks, benefits discussed, failed monotherapy, understands risks, MOther agrees as well  5/29:stable, at baseline 6/26:at baseline 7/24:adjust med per plan 9/11:adjust meds per plan 11/6: adjust meds 12/4: adjust meds 12/31:continue meds 1/18: resume risperdal 1mg po qam and 2mg at bedtime for "anxiety and intrusive thoughts" call in 1 week if needed or follow up in 2 weeks 1/27: patient is restless, still feeling lack of motivation, agree to lower abilify to 10mg and lower lorazepam to 1mg at bedtime, f/u in 2 days 1/29:lower abilify to 5mg daily in 3-4 days and start lamictal 25mg daily for 2 weeks then BID 2/26:change risperdal to 3mg at bedtime, and titrate lamictal 3/26:at baseline, prefers to continue current medication

## 2025-03-26 NOTE — DISCUSSION/SUMMARY
[Date of Last Physical Exam: _____] : Date of Last Physical Exam: [unfilled] [Date of Last Annual Labs: _____] : Date of Last Annual Labs: [unfilled] [Annual Review of Systems Completed?] : Annual Review of Systems Completed: Yes [Tobacco Screening Completed?] : Tobacco Screening Completed: Yes [Date of Last AIMS: _____] : Date of Last AIMS: [unfilled] [Date of Last HbgA1c: _____] : Date of Last HbgA1c: [unfilled] [Date of Last Lipid Profile: _____] : Date of Last Lipid Profile: [unfilled] [Potential impact of patientâ??s physical health conditions on psychiatric care?] : Potential impact of patient's physical health conditions on psychiatric care: No [Does patient require any additional health services or referrals?] : Does patient require any additional health services or referrals: No [FreeTextEntry1] : Medication management  Current medications 1. risperdal 3mg at bedtime 3. cogentin 0.5mg po BID, no longer needed, was d/c 4. lorazepam 1mg at bedtime 5. trileptal 300mg po BID 6. abilify 5mg daily 7. lamictal  50mg po BID  Follow up in 4 weeks, will call if needs anything sooner

## 2025-03-26 NOTE — PHYSICAL EXAM
[None] : none [Constricted] : constricted [Normal] : normal [Fair] : fair [Walk Is Unsteady (Ataxia)] : no ataxia [Wide-Based] : not wide-based [Rigidity] : no rigidity [Dystonia] : no dystonia was noted [FreeTextEntry8] : better [2 - Borderline mentally ill] : 2 - Borderline mentally ill (subtle or suspected pathology) [2 - Much improved] : 2 - Much improved  (notably better with significant reduction of symptoms; increase in the level of functioning but some symptoms remain)

## 2025-04-23 NOTE — REVIEW OF SYSTEMS
[Negative] : Allergic/Immunologic [FreeTextEntry8] : hx of elevated creatinine from lithium [de-identified] : mild hypothyroid

## 2025-04-23 NOTE — REASON FOR VISIT
[Patient] : Patient [Mother] : mother [FreeTextEntry2] : 4/25 ISTOP checked [FreeTextEntry1] : "I am good."

## 2025-04-23 NOTE — PHYSICAL EXAM
[None] : none [Constricted] : constricted [Normal] : normal [Fair] : fair [2 - Borderline mentally ill] : 2 - Borderline mentally ill (subtle or suspected pathology) [Walk Is Unsteady (Ataxia)] : no ataxia [Wide-Based] : not wide-based [Rigidity] : no rigidity [Dystonia] : no dystonia was noted [FreeTextEntry8] : better [4 - No change] : 4 - No change  (symptoms remain essentially unchanged)

## 2025-05-21 NOTE — PHYSICAL EXAM
[None] : none [Constricted] : constricted [Normal] : normal [Fair] : fair [4 - No change] : 4 - No change  (symptoms remain essentially unchanged) [Walk Is Unsteady (Ataxia)] : no ataxia [Wide-Based] : not wide-based [Rigidity] : no rigidity [Dystonia] : no dystonia was noted [FreeTextEntry8] : better [3 - Mildly ill] : 3 - Mildly ill  (clearly established symptoms with minimal, if any, distress or difficulty in social and occupational function)

## 2025-05-21 NOTE — REVIEW OF SYSTEMS
[Negative] : Allergic/Immunologic [FreeTextEntry8] : hx of elevated creatinine from lithium [de-identified] : mild hypothyroid

## 2025-05-21 NOTE — REASON FOR VISIT
[Patient] : Patient [Mother] : mother [FreeTextEntry2] : 5/25 ISTOP checked [FreeTextEntry1] : "I am well."

## 2025-05-21 NOTE — HISTORY OF PRESENT ILLNESS
[No] : no [Responsibility to family or others] : responsibility to family or others [Identifies reasons for living] : identifies reasons for living [Future oriented] : future oriented [Supportive social network or family] : supportive social network or family [Fear of death or dying due to pain/suffering] : fear of death or dying due to pain/suffering [Positive therapeutic relationships] : positive therapeutic relationships [Ability to cope with stress] : ability to cope with stress [None Known] : none known [Residential stability] : residential stability [Relationship stability] : relationship stability [Sobriety] : sobriety [Engagement in treatment] : engagement in treatment [Affective stability] : affective stability [Insight into violence risk and need for management/ treatment] : insight into violence risk and need for management/ treatment [Good treatment response/ compliance] : good treatment response/ compliance [FreeTextEntry1] : This is a 41 year old patient, domiciled with Mother, on SSD, with history of Bipolar d/o, anxiety, elevated creatinine, hypothyroid who presents for medication management.  The patient reports more depressed mood, fair sleep and appetite.  The patient denies any symptoms of ayesha and psychosis at this time.  The patient has more anxiety that impairs their daily functioning. The patient denies any suicidal ideation, homicidal ideation, no auditory or visual hallucinations, or paranoid ideation.   The denies patients denies any drug or alcohol use, and is a non-smoker. I requested the patient's updated physical and labs from their PCP, Dr. Landry.  Coping skills were discussed and a safety plan was provided.  The patient was educated on the risks, benefits, and alternatives of their psychiatric medications.  The patient will not engage in psychotherapy at this time.  The patient consents to ongoing medication management.  Patient seen with Mother, discussed medication options at length  12/23: patient and Mother reports he has some impaired sleep, impaired concentration and daytime sedation.  discussed options at length including resuming clozapine, increasing lorazepam, and increasing risperdal. Risk, benefits discussed, will raise lorazepam 1.5mg-2mg at bedtime and risperdqal to 1mg po qam and 4mg at bedtime 12/27:will give current medications 2 more weeks and then adjust again if needed, consider low dose seroquel as he benefitted from it for depressive symptoms, and has failed monotherapy multiple times 1/11:patient appears close to baseline, Mother and patient agree to continue current medications, with option to lower risperdal in the future if needed 2/7:adjust risperdal 3/6:adjust risperdal 4/3:adjust risperda, conside adding abilify 5/1:lower risperdal and add abilify 2mg po qam, risks, benefits discussed, failed monotherapy, understands risks, MOther agrees as well  5/29:stable, at baseline 6/26:at baseline 7/24:adjust med per plan 9/11:adjust meds per plan 11/6: adjust meds 12/4: adjust meds 12/31:continue meds 1/18: resume risperdal 1mg po qam and 2mg at bedtime for "anxiety and intrusive thoughts" call in 1 week if needed or follow up in 2 weeks 1/27: patient is restless, still feeling lack of motivation, agree to lower abilify to 10mg and lower lorazepam to 1mg at bedtime, f/u in 2 days 1/29:lower abilify to 5mg daily in 3-4 days and start lamictal 25mg daily for 2 weeks then BID 2/26:change risperdal to 3mg at bedtime, and titrate lamictal 3/26:at baseline, prefers to continue current medication

## 2025-06-18 NOTE — REASON FOR VISIT
[Patient] : Patient [Mother] : mother [FreeTextEntry2] : 6/25 ISTOP checked [FreeTextEntry1] : "I am good, medications are working well."

## 2025-06-18 NOTE — REVIEW OF SYSTEMS
[Negative] : Allergic/Immunologic [FreeTextEntry8] : hx of elevated creatinine from lithium [de-identified] : mild hypothyroid

## 2025-06-18 NOTE — PHYSICAL EXAM
[None] : none [Constricted] : constricted [Normal] : normal [Fair] : fair [3 - Mildly ill] : 3 - Mildly ill  (clearly established symptoms with minimal, if any, distress or difficulty in social and occupational function) [4 - No change] : 4 - No change  (symptoms remain essentially unchanged) [Walk Is Unsteady (Ataxia)] : no ataxia [Wide-Based] : not wide-based [Rigidity] : no rigidity [Dystonia] : no dystonia was noted [FreeTextEntry8] : better

## 2025-07-16 NOTE — DISCUSSION/SUMMARY
[Date of Last Physical Exam: _____] : Date of Last Physical Exam: [unfilled] [Date of Last Annual Labs: _____] : Date of Last Annual Labs: [unfilled] [Annual Review of Systems Completed?] : Annual Review of Systems Completed: Yes [Tobacco Screening Completed?] : Tobacco Screening Completed: Yes [Date of Last AIMS: _____] : Date of Last AIMS: [unfilled] [Date of Last HbgA1c: _____] : Date of Last HbgA1c: [unfilled] [Date of Last Lipid Profile: _____] : Date of Last Lipid Profile: [unfilled] [Potential impact of patient’s physical health conditions on psychiatric care?] : Potential impact of patient's physical health conditions on psychiatric care: No [Does patient require any additional health services or referrals?] : Does patient require any additional health services or referrals: No [FreeTextEntry1] : Medication management  Current medications 1. risperdal 3mg at bedtime 3. cogentin 0.5mg po BID, no longer needed, was d/c 4. lorazepam 1mg at bedtime 5. trileptal 300mg po BID 6. abilify 5mg daily 7. lamictal  50mg po BID  Follow up in 4 weeks, will call if needs anything sooner

## 2025-07-16 NOTE — REASON FOR VISIT
[FreeTextEntry2] : 7/25 ISTOP checked [Patient] : Patient [Mother] : mother [FreeTextEntry1] : "I am well."

## 2025-07-16 NOTE — REVIEW OF SYSTEMS
[Negative] : Allergic/Immunologic [FreeTextEntry8] : hx of elevated creatinine from lithium [de-identified] : mild hypothyroid

## 2025-07-16 NOTE — PHYSICAL EXAM
[Walk Is Unsteady (Ataxia)] : no ataxia [Wide-Based] : not wide-based [None] : none [Rigidity] : no rigidity [Dystonia] : no dystonia was noted [Constricted] : constricted [Normal] : normal [Fair] : fair [FreeTextEntry8] : better [3 - Mildly ill] : 3 - Mildly ill  (clearly established symptoms with minimal, if any, distress or difficulty in social and occupational function) [4 - No change] : 4 - No change  (symptoms remain essentially unchanged)

## 2025-07-16 NOTE — HISTORY OF PRESENT ILLNESS
[FreeTextEntry1] : This is a 41 year old patient, domiciled with Mother, on SSD, with history of Bipolar d/o, anxiety, elevated creatinine, hypothyroid who presents for medication management.  The patient reports more depressed mood, fair sleep and appetite.  The patient denies any symptoms of ayesha and psychosis at this time.  The patient has more anxiety that impairs their daily functioning. The patient denies any suicidal ideation, homicidal ideation, no auditory or visual hallucinations, or paranoid ideation.   The denies patients denies any drug or alcohol use, and is a non-smoker. I requested the patient's updated physical and labs from their PCP, Dr. Landry.  Coping skills were discussed and a safety plan was provided.  The patient was educated on the risks, benefits, and alternatives of their psychiatric medications.  The patient will not engage in psychotherapy at this time.  The patient consents to ongoing medication management.  Patient seen with Mother, discussed medication options at length  12/23: patient and Mother reports he has some impaired sleep, impaired concentration and daytime sedation.  discussed options at length including resuming clozapine, increasing lorazepam, and increasing risperdal. Risk, benefits discussed, will raise lorazepam 1.5mg-2mg at bedtime and risperdqal to 1mg po qam and 4mg at bedtime 12/27:will give current medications 2 more weeks and then adjust again if needed, consider low dose seroquel as he benefitted from it for depressive symptoms, and has failed monotherapy multiple times 1/11:patient appears close to baseline, Mother and patient agree to continue current medications, with option to lower risperdal in the future if needed 2/7:adjust risperdal 3/6:adjust risperdal 4/3:adjust risperda, conside adding abilify 5/1:lower risperdal and add abilify 2mg po qam, risks, benefits discussed, failed monotherapy, understands risks, MOther agrees as well  5/29:stable, at baseline 6/26:at baseline 7/24:adjust med per plan 9/11:adjust meds per plan 11/6: adjust meds 12/4: adjust meds 12/31:continue meds 1/18: resume risperdal 1mg po qam and 2mg at bedtime for "anxiety and intrusive thoughts" call in 1 week if needed or follow up in 2 weeks 1/27: patient is restless, still feeling lack of motivation, agree to lower abilify to 10mg and lower lorazepam to 1mg at bedtime, f/u in 2 days 1/29:lower abilify to 5mg daily in 3-4 days and start lamictal 25mg daily for 2 weeks then BID 2/26:change risperdal to 3mg at bedtime, and titrate lamictal 3/26:at baseline, prefers to continue current medication    [No] : no [Responsibility to family or others] : responsibility to family or others [Identifies reasons for living] : identifies reasons for living [Future oriented] : future oriented [Supportive social network or family] : supportive social network or family [Fear of death or dying due to pain/suffering] : fear of death or dying due to pain/suffering [Positive therapeutic relationships] : positive therapeutic relationships [Ability to cope with stress] : ability to cope with stress [None Known] : none known [Residential stability] : residential stability [Relationship stability] : relationship stability [Sobriety] : sobriety [Engagement in treatment] : engagement in treatment [Affective stability] : affective stability [Insight into violence risk and need for management/ treatment] : insight into violence risk and need for management/ treatment [Good treatment response/ compliance] : good treatment response/ compliance